# Patient Record
Sex: FEMALE | Race: WHITE | NOT HISPANIC OR LATINO | Employment: FULL TIME | URBAN - METROPOLITAN AREA
[De-identification: names, ages, dates, MRNs, and addresses within clinical notes are randomized per-mention and may not be internally consistent; named-entity substitution may affect disease eponyms.]

---

## 2022-07-20 ENCOUNTER — OFFICE VISIT (OUTPATIENT)
Dept: FAMILY MEDICINE CLINIC | Facility: CLINIC | Age: 42
End: 2022-07-20

## 2022-07-20 VITALS
BODY MASS INDEX: 39.61 KG/M2 | DIASTOLIC BLOOD PRESSURE: 80 MMHG | OXYGEN SATURATION: 99 % | HEART RATE: 72 BPM | HEIGHT: 64 IN | SYSTOLIC BLOOD PRESSURE: 120 MMHG | WEIGHT: 232 LBS

## 2022-07-20 DIAGNOSIS — Z12.31 VISIT FOR SCREENING MAMMOGRAM: ICD-10-CM

## 2022-07-20 DIAGNOSIS — Z00.00 WELL ADULT EXAM: Primary | ICD-10-CM

## 2022-07-20 PROCEDURE — 99386 PREV VISIT NEW AGE 40-64: CPT | Performed by: FAMILY MEDICINE

## 2022-07-20 NOTE — PROGRESS NOTES
"Chief Complaint  Annual Exam (Mammogram /)    Subjective          Denise Anders presents to [facility] for   History of Present Illness     She is a travel nurse. She has had labs last year was normal except slight elevated glucose. She has checked random glucose at work and normal. Last pap smear normal last year. Zyrtec as needed for seasonal allergies.     EOS occasional budesonide. She tried vegan diet. She has not had food get stuck.     She eats out often. She drinks diet coke. She walks minimum 10,000 steps at work. She gained weight when she couldn't walk after foot surgery.             Objective   Vital Signs:   Vitals:    07/20/22 1541   BP: 120/80   Pulse: 72   SpO2: 99%   Weight: 105 kg (232 lb)   Height: 162.6 cm (64\")     Body mass index is 39.82 kg/m².    Class 2 Severe Obesity (BMI >=35 and <=39.9). Obesity-related health conditions include the following: none. Obesity is newly identified. BMI is is above average; BMI management plan is completed. We discussed Continue physical activity.          Physical Exam  Vitals reviewed.   Constitutional:       General: She is not in acute distress.     Appearance: She is obese. She is not ill-appearing.   Cardiovascular:      Rate and Rhythm: Normal rate and regular rhythm.   Pulmonary:      Effort: Pulmonary effort is normal.      Breath sounds: Normal breath sounds.   Neurological:      Mental Status: She is alert.   Psychiatric:         Mood and Affect: Mood normal.        Result Review :                Immunization History   Administered Date(s) Administered   • COVID-19 (MODERNA) 1st, 2nd, 3rd Dose Only 01/06/2021, 02/04/2021   • COVID-19 (MODERNA) BOOSTER 11/04/2021       Health Maintenance   Topic Date Due   • TDAP/TD VACCINES (1 - Tdap) Never done   • INFLUENZA VACCINE  10/01/2022            Assessment and Plan    Diagnoses and all orders for this visit:    1. Well adult exam (Primary)  Cervical cancer screening is " up-to-date.  She reports having labs with her last PCP.  No blood work needed today.  2. Visit for screening mammogram  -     Mammo Screening Digital Tomosynthesis Bilateral With CAD; Future  Discussed screening guidelines.  She has no symptoms.  May repeat mammogram every 1 to 2 years.      Counseled on health maintenance topics and preventative care recommendations: Breast cancer screening      Follow Up   Return if symptoms worsen or fail to improve.  Patient was given instructions and counseling regarding her condition or for health maintenance advice. Please see specific information pulled into the AVS if appropriate.      Electronically signed by Latonya Palma MD, 07/20/22, 4:51 PM EDT.

## 2022-08-16 ENCOUNTER — HOSPITAL ENCOUNTER (OUTPATIENT)
Dept: MAMMOGRAPHY | Facility: HOSPITAL | Age: 42
Discharge: HOME | End: 2022-08-16
Admitting: FAMILY MEDICINE

## 2022-08-16 DIAGNOSIS — Z12.31 VISIT FOR SCREENING MAMMOGRAM: ICD-10-CM

## 2022-08-16 PROCEDURE — 77067 SCR MAMMO BI INCL CAD: CPT

## 2022-08-16 PROCEDURE — 77063 BREAST TOMOSYNTHESIS BI: CPT

## 2022-08-16 PROCEDURE — 77063 BREAST TOMOSYNTHESIS BI: CPT | Performed by: RADIOLOGY

## 2022-08-16 PROCEDURE — 77067 SCR MAMMO BI INCL CAD: CPT | Performed by: RADIOLOGY

## 2022-10-25 ENCOUNTER — HOSPITAL ENCOUNTER (OUTPATIENT)
Dept: MAMMOGRAPHY | Facility: HOSPITAL | Age: 42
Discharge: HOME | End: 2022-10-25

## 2022-10-25 ENCOUNTER — HOSPITAL ENCOUNTER (OUTPATIENT)
Dept: ULTRASOUND IMAGING | Facility: HOSPITAL | Age: 42
Discharge: HOME | End: 2022-10-25

## 2022-10-25 DIAGNOSIS — R92.8 ABNORMAL MAMMOGRAM: ICD-10-CM

## 2022-10-25 PROCEDURE — 77066 DX MAMMO INCL CAD BI: CPT

## 2022-10-25 PROCEDURE — 77062 BREAST TOMOSYNTHESIS BI: CPT | Performed by: RADIOLOGY

## 2022-10-25 PROCEDURE — G0279 TOMOSYNTHESIS, MAMMO: HCPCS

## 2022-10-25 PROCEDURE — 76642 ULTRASOUND BREAST LIMITED: CPT | Performed by: RADIOLOGY

## 2022-10-25 PROCEDURE — 77066 DX MAMMO INCL CAD BI: CPT | Performed by: RADIOLOGY

## 2022-10-25 PROCEDURE — 76642 ULTRASOUND BREAST LIMITED: CPT

## 2022-10-26 ENCOUNTER — TELEPHONE (OUTPATIENT)
Dept: MAMMOGRAPHY | Facility: HOSPITAL | Age: 42
End: 2022-10-26

## 2022-10-31 ENCOUNTER — HOSPITAL ENCOUNTER (OUTPATIENT)
Dept: MAMMOGRAPHY | Facility: HOSPITAL | Age: 42
Discharge: HOME | End: 2022-10-31

## 2022-10-31 ENCOUNTER — HOSPITAL ENCOUNTER (OUTPATIENT)
Dept: ULTRASOUND IMAGING | Facility: HOSPITAL | Age: 42
Discharge: HOME | End: 2022-10-31

## 2022-10-31 DIAGNOSIS — R92.8 ABNORMAL MAMMOGRAM: ICD-10-CM

## 2022-10-31 PROCEDURE — 77065 DX MAMMO INCL CAD UNI: CPT | Performed by: RADIOLOGY

## 2022-10-31 PROCEDURE — A4648 IMPLANTABLE TISSUE MARKER: HCPCS

## 2022-10-31 PROCEDURE — 88341 IMHCHEM/IMCYTCHM EA ADD ANTB: CPT | Performed by: RADIOLOGY

## 2022-10-31 PROCEDURE — 19081 BX BREAST 1ST LESION STRTCTC: CPT | Performed by: RADIOLOGY

## 2022-10-31 PROCEDURE — 88305 TISSUE EXAM BY PATHOLOGIST: CPT | Performed by: RADIOLOGY

## 2022-10-31 PROCEDURE — 19083 BX BREAST 1ST LESION US IMAG: CPT | Performed by: RADIOLOGY

## 2022-10-31 PROCEDURE — 88342 IMHCHEM/IMCYTCHM 1ST ANTB: CPT | Performed by: RADIOLOGY

## 2022-10-31 RX ORDER — LIDOCAINE HYDROCHLORIDE 20 MG/ML
2.5 INJECTION, SOLUTION INFILTRATION; PERINEURAL ONCE
Status: COMPLETED | OUTPATIENT
Start: 2022-10-31 | End: 2022-10-31

## 2022-10-31 RX ORDER — LIDOCAINE HYDROCHLORIDE AND EPINEPHRINE BITARTRATE 20; .01 MG/ML; MG/ML
10 INJECTION, SOLUTION SUBCUTANEOUS ONCE
Status: COMPLETED | OUTPATIENT
Start: 2022-10-31 | End: 2022-10-31

## 2022-10-31 RX ORDER — LIDOCAINE HYDROCHLORIDE 20 MG/ML
2 INJECTION, SOLUTION INFILTRATION; PERINEURAL ONCE
Status: COMPLETED | OUTPATIENT
Start: 2022-10-31 | End: 2022-10-31

## 2022-10-31 RX ORDER — LIDOCAINE HYDROCHLORIDE AND EPINEPHRINE 20; 5 MG/ML; UG/ML
10 INJECTION, SOLUTION EPIDURAL; INFILTRATION; INTRACAUDAL; PERINEURAL ONCE
Status: COMPLETED | OUTPATIENT
Start: 2022-10-31 | End: 2022-10-31

## 2022-10-31 RX ADMIN — LIDOCAINE HYDROCHLORIDE 1 ML: 20 INJECTION, SOLUTION INFILTRATION; PERINEURAL at 15:06

## 2022-10-31 RX ADMIN — LIDOCAINE HYDROCHLORIDE,EPINEPHRINE BITARTRATE 10 ML: 20; .005 INJECTION, SOLUTION EPIDURAL; INFILTRATION; INTRACAUDAL; PERINEURAL at 13:57

## 2022-10-31 RX ADMIN — LIDOCAINE HYDROCHLORIDE AND EPINEPHRINE 9 ML: 20; 10 INJECTION, SOLUTION INFILTRATION; PERINEURAL at 15:06

## 2022-10-31 RX ADMIN — LIDOCAINE HYDROCHLORIDE 1 ML: 20 INJECTION, SOLUTION INFILTRATION; PERINEURAL at 13:56

## 2022-11-02 LAB
CYTO UR: NORMAL
LAB AP CASE REPORT: NORMAL
LAB AP CLINICAL INFORMATION: NORMAL
PATH REPORT.FINAL DX SPEC: NORMAL
PATH REPORT.GROSS SPEC: NORMAL

## 2022-11-03 ENCOUNTER — TELEPHONE (OUTPATIENT)
Dept: MAMMOGRAPHY | Facility: HOSPITAL | Age: 42
End: 2022-11-03

## 2022-11-03 LAB
CYTO UR: NORMAL
LAB AP CASE REPORT: NORMAL
LAB AP CLINICAL INFORMATION: NORMAL
LAB AP DIAGNOSIS COMMENT: NORMAL
LAB AP SPECIAL STAINS: NORMAL
PATH REPORT.FINAL DX SPEC: NORMAL
PATH REPORT.GROSS SPEC: NORMAL

## 2022-11-03 NOTE — TELEPHONE ENCOUNTER
"Patient notified of pathology results and recommendations. Verbalizes understanding. States having some discomfort, using analgesics with relief. Denies signs and symptoms of infection. 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 States she may have a second opinion before seeing surgeon for consult in [place]; wanted to know if she \"had to\" have the area removed. Patient was told the recommendation was for her to have a consult with a surgeon to get more information so she would be able to make an informed decision. Patient was encouraged to notify us with surgeon's name & contact info, even if consult isn't until January. Verbalized understanding. Dr DEGROOT Masters aware.    "

## 2023-01-29 NOTE — TELEPHONE ENCOUNTER
Dr. MIKAYLA Valdes notified of patient's request for a biopsy of the right breast of  probable benign findings. 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  She would like this area biopsied on the same day as the previously scheduled left biopsy.  Patient will be notified after hearing from Dr. Valdes.    
show

## 2025-02-17 ENCOUNTER — READMISSION MANAGEMENT (OUTPATIENT)
Dept: CALL CENTER | Facility: HOSPITAL | Age: 45
End: 2025-02-17
Payer: COMMERCIAL

## 2025-02-17 NOTE — OUTREACH NOTE
Prep Survey      Flowsheet Row Responses   [facility] facility patient discharged from? Non-BH   Is LACE score < 7 ? Non-BH Discharge   Eligibility Not Eligible   What are the reasons patient is not eligible? Other   Does the patient have one of the following disease processes/diagnoses(primary or secondary)? Other   Prep survey completed? Yes            Lorrie RAMIREZ - Registered Nurse